# Patient Record
Sex: MALE | Race: WHITE | NOT HISPANIC OR LATINO | URBAN - METROPOLITAN AREA
[De-identification: names, ages, dates, MRNs, and addresses within clinical notes are randomized per-mention and may not be internally consistent; named-entity substitution may affect disease eponyms.]

---

## 2024-03-01 ENCOUNTER — EMERGENCY (EMERGENCY)
Facility: HOSPITAL | Age: 29
LOS: 1 days | Discharge: HOME | End: 2024-03-01
Admitting: EMERGENCY MEDICINE
Payer: COMMERCIAL

## 2024-03-01 VITALS
HEIGHT: 75 IN | RESPIRATION RATE: 17 BRPM | TEMPERATURE: 98 F | WEIGHT: 184.97 LBS | DIASTOLIC BLOOD PRESSURE: 75 MMHG | OXYGEN SATURATION: 99 % | HEART RATE: 100 BPM | SYSTOLIC BLOOD PRESSURE: 138 MMHG

## 2024-03-01 PROCEDURE — 76870 US EXAM SCROTUM: CPT | Mod: 26

## 2024-03-01 PROCEDURE — 99284 EMERGENCY DEPT VISIT MOD MDM: CPT

## 2024-03-01 NOTE — ED PROVIDER NOTE - PHYSICAL EXAMINATION
General: well developed, well nourished, no distress  Eyes: no scleral injection  Neck: non-tender, full range of motion, supple.   Respiratory: unlabored breathing  Cardiovascular: no central cyanosis  : No testicular lesions, swelling, color change.  No tenderness to palpation.  No discharge appreciated.  Musculoskeletal: normal gait.   Extremities: normal range of motion, non-tender.  Neurologic: alert, oriented to person, oriented to place, oriented to time.    Skin: normal color.  Negative For: rash  Psychiatric: normal affect, normal insight, normal concentration

## 2024-03-01 NOTE — ED PROVIDER NOTE - OBJECTIVE STATEMENT
29 yo M, no pmh, presents to this ED for R-sided testicular pain. Pt states that it started upon awakening .  Patient went to urgent care earlier today, and a ultrasound was ordered and patient went to clinic so radiology to get it done.  However states that he is going to get the results back in 3 to 5 days and was quite anxious.  Also states that the pain has gotten slightly worse.  States that it started as a 3-10 oh 6 out of 10.  Has not tried any medications for symptomatic relief..  Patient states that he had a urine done at the urgent care and was unremarkable.  Has not been sexually active for 6 months, and does not wish to have STD testing.

## 2024-03-01 NOTE — ED PROVIDER NOTE - NSFOLLOWUPINSTRUCTIONS_ED_ALL_ED_FT
Overview  Pain in the testicles can be caused by many things. These include an injury to your testicles, an infection, and testicular torsion.    Injuries and genital problems most often happen during sports or recreational activities, at work, or in a fall. Pain caused by an injury usually goes away quickly. There is usually no long-term harm to your testicles.    Infections that may cause pain include:    An infection of the testicles. This is called orchitis.  An abscess in the scrotum or testicles.  Some sexually transmitted infections (STIs).  A swelling of the tube attached to a testicle. This swelling is called epididymitis. It can cause pain and is sometimes caused by an infection.  Testicular torsion happens when a testicle twists on the spermatic cord. This cuts off the blood supply to the testicle. This is an emergency that often requires immediate surgery.    Follow-up care is a key part of your treatment and safety. Be sure to make and go to all appointments, and call your doctor or nurse advice line (126 in most provinces and [place]) if you are having problems. It's also a good idea to know your test results and keep a list of the medicines you take.    How can you care for yourself at home?  Rest and protect your testicles and groin. Stop, change, or take a break from any activity that may be causing your pain or soreness.  Put ice or a cold pack on the area for 10 to 20 minutes at a time. Put a thin cloth between the ice and your skin.  Wear briefs, not boxers. Briefs help support the injured area. You can use snug underwear or compression shorts if it helps relieve your pain.  If your doctor prescribed antibiotics, take them as directed. Do not stop taking them just because you feel better. You need to take the full course of antibiotics.  Ask your doctor if you can take an over-the-counter pain medicine, such as acetaminophen (Tylenol), ibuprofen (Advil, Motrin), or naproxen (Aleve). Be safe with medicines. Read and follow all instructions on the label.  If the doctor gave you a prescription medicine for pain, take it as prescribed.  When should you call for help?  	  Call your doctor or nurse advice line now or seek immediate medical care if:    You have severe or increasing pain.  You notice a change in how your testicles look or are positioned in your scrotum.  You notice new or worse swelling in your scrotum.  You have symptoms of a urinary problem, such as a urinary tract infection. These may include:  Pain or burning when you urinate.  A frequent need to urinate without being able to pass much urine.  Pain in the flank, which is just below the rib cage and above the waist on either side of the back.  Blood in your urine.  A fever.  Watch closely for changes in your health, and be sure to contact your doctor or nurse advice line if:    You do not get better as expected.

## 2024-03-01 NOTE — ED ADULT NURSE NOTE - NSFALLUNIVINTERV_ED_ALL_ED
Bed/Stretcher in lowest position, wheels locked, appropriate side rails in place/Call bell, personal items and telephone in reach/Instruct patient to call for assistance before getting out of bed/chair/stretcher/Non-slip footwear applied when patient is off stretcher/[place] to call system/Physically safe environment - no spills, clutter or unnecessary equipment/Purposeful proactive rounding/Room/bathroom lighting operational, light cord in reach

## 2024-03-01 NOTE — ED PROVIDER NOTE - CLINICAL SUMMARY MEDICAL DECISION MAKING FREE TEXT BOX
28-year-old male presents this emergency department for testicular pain  Urinalysis was done at urgent care was unremarkable  Patient is refusing STD testing  Testicular ultrasound could not be found, and patient is requesting this week and the results today.  Test ultrasound ordered  Will continue to monitor patient

## 2024-03-01 NOTE — ED PROVIDER NOTE - IV ALTEPLASE EXCL ABS HIDDEN
show Propranolol Pregnancy And Lactation Text: This medication is Pregnancy Category C and it isn't known if it is safe during pregnancy. It is excreted in breast milk.

## 2024-03-01 NOTE — ED ADULT NURSE NOTE - OBJECTIVE STATEMENT
right testicular pain x 1 day made worse over the last hour. Denies urinary symptoms or discharge. Pt alert and oriented x3, ambulatory, respirations even and unlabored.

## 2024-03-01 NOTE — ED PROVIDER NOTE - PROGRESS NOTE DETAILS
Ultrasound unremarkable  Patient stable for discharge and counseled to the patient.  Patient or stands agrees to plan.  Agreed to follow primary care doctor in 2 to 3 days.

## 2024-03-01 NOTE — ED PROVIDER NOTE - PATIENT PORTAL LINK FT
You can access the FollowMyHealth Patient Portal offered by [facility] by registering at the following website: [email or web address]. By joining [employer]’s FollowMyHealth portal, you will also be able to view your health information using other applications (apps) compatible with our system.

## 2024-03-05 DIAGNOSIS — N50.811 RIGHT TESTICULAR PAIN: ICD-10-CM
